# Patient Record
Sex: MALE | Race: WHITE | URBAN - METROPOLITAN AREA
[De-identification: names, ages, dates, MRNs, and addresses within clinical notes are randomized per-mention and may not be internally consistent; named-entity substitution may affect disease eponyms.]

---

## 2022-01-28 ENCOUNTER — OFFICE VISIT (OUTPATIENT)
Dept: URBAN - METROPOLITAN AREA CLINIC 23 | Facility: CLINIC | Age: 82
End: 2022-01-28
Payer: COMMERCIAL

## 2022-01-28 DIAGNOSIS — E11.9 TYPE 2 DIABETES MELLITUS W/O COMPLICATION: ICD-10-CM

## 2022-01-28 PROCEDURE — 99204 OFFICE O/P NEW MOD 45 MIN: CPT | Performed by: OPTOMETRIST

## 2022-01-28 ASSESSMENT — KERATOMETRY
OD: 45.38
OS: 44.88

## 2022-01-28 ASSESSMENT — INTRAOCULAR PRESSURE
OD: 16
OS: 14

## 2022-01-28 ASSESSMENT — VISUAL ACUITY
OD: 20/60
OS: 20/40

## 2022-01-28 NOTE — IMPRESSION/PLAN
Impression: Type 2 diabetes mellitus w/o complication: H40.4 Bilateral. Condition: stable. Plan: Diabetes type II: no background retinopathy, no signs of neovascularization noted. OPTOS photos ordered and reviewed. Discussed ocular and systemic benefits of blood sugar control.

## 2022-01-28 NOTE — IMPRESSION/PLAN
Impression: Combined forms of age-related cataract, bilateral: H25.813 Bilateral. Condition: moderate. Symptoms: could improve with surgery. Vision: vision affected. Plan: Cataracts account for the patient's complaints. Patient understands changing glasses will not improve vision. Patient desires to have surgery, recommend surgical consult. Patient desires to have OD only done at this time. Briefly discussed advanced technology options.

## 2022-02-09 ENCOUNTER — OFFICE VISIT (OUTPATIENT)
Dept: URBAN - METROPOLITAN AREA CLINIC 28 | Facility: CLINIC | Age: 82
End: 2022-02-09
Payer: COMMERCIAL

## 2022-02-09 DIAGNOSIS — H25.813 COMBINED FORMS OF AGE-RELATED CATARACT, BILATERAL: Primary | ICD-10-CM

## 2022-02-09 PROCEDURE — 99204 OFFICE O/P NEW MOD 45 MIN: CPT | Performed by: OPHTHALMOLOGY

## 2022-02-09 ASSESSMENT — INTRAOCULAR PRESSURE
OD: 15
OS: 17

## 2022-02-09 ASSESSMENT — VISUAL ACUITY
OD: 20/40
OS: 20/40

## 2022-02-09 ASSESSMENT — KERATOMETRY
OD: 45.50
OS: 45.25

## 2022-03-07 ENCOUNTER — PRE-OPERATIVE VISIT (OUTPATIENT)
Dept: URBAN - METROPOLITAN AREA CLINIC 28 | Facility: CLINIC | Age: 82
End: 2022-03-07
Payer: MEDICARE

## 2022-03-07 RX ORDER — DEXAMETHASONE 517 UG/.005ML
9 % INJECTION, SUSPENSION INTRAOCULAR
Qty: 0 | Refills: 0 | Status: ACTIVE
Start: 2022-03-07

## 2022-03-07 ASSESSMENT — PACHYMETRY
OS: 3.13
OS: 23.99
OD: 24.22
OD: 3.18

## 2022-03-15 ENCOUNTER — POST-OPERATIVE VISIT (OUTPATIENT)
Dept: URBAN - METROPOLITAN AREA CLINIC 28 | Facility: CLINIC | Age: 82
End: 2022-03-15
Payer: MEDICARE

## 2022-03-15 DIAGNOSIS — Z48.810 ENCOUNTER FOR SURGICAL AFTERCARE FOLLOWING SURGERY ON A SENSE ORGAN: Primary | ICD-10-CM

## 2022-03-15 PROCEDURE — 99024 POSTOP FOLLOW-UP VISIT: CPT | Performed by: OPTOMETRIST

## 2022-03-15 ASSESSMENT — INTRAOCULAR PRESSURE
OD: 18
OS: 16

## 2022-03-15 NOTE — IMPRESSION/PLAN
Impression: S/P CE/Standard IOL LI61SE 16.00 OD - 1 Day. Encounter for surgical aftercare following surgery on a sense organ  Z48.810. Post operative instructions reviewed - Plan: --Advised patient to use artificial tears for comfort.

## 2022-03-23 ENCOUNTER — OFFICE VISIT (OUTPATIENT)
Dept: URBAN - METROPOLITAN AREA CLINIC 28 | Facility: CLINIC | Age: 82
End: 2022-03-23
Payer: MEDICARE

## 2022-03-23 DIAGNOSIS — H25.812 COMBINED FORMS OF AGE-RELATED CATARACT, LEFT EYE: Primary | ICD-10-CM

## 2022-03-23 PROCEDURE — 99213 OFFICE O/P EST LOW 20 MIN: CPT | Performed by: OPHTHALMOLOGY

## 2022-03-23 ASSESSMENT — INTRAOCULAR PRESSURE
OS: 18
OD: 20

## 2022-03-23 ASSESSMENT — VISUAL ACUITY: OS: 20/40

## 2022-03-29 ENCOUNTER — POST-OPERATIVE VISIT (OUTPATIENT)
Dept: URBAN - METROPOLITAN AREA CLINIC 28 | Facility: CLINIC | Age: 82
End: 2022-03-29
Payer: MEDICARE

## 2022-03-29 PROCEDURE — 99024 POSTOP FOLLOW-UP VISIT: CPT | Performed by: OPTOMETRIST

## 2022-03-29 ASSESSMENT — INTRAOCULAR PRESSURE
OS: 19
OD: 19

## 2022-04-04 ENCOUNTER — POST-OPERATIVE VISIT (OUTPATIENT)
Dept: URBAN - METROPOLITAN AREA CLINIC 28 | Facility: CLINIC | Age: 82
End: 2022-04-04
Payer: MEDICARE

## 2022-04-04 DIAGNOSIS — Z96.1 PRESENCE OF INTRAOCULAR LENS: Primary | ICD-10-CM

## 2022-04-04 PROCEDURE — 99024 POSTOP FOLLOW-UP VISIT: CPT | Performed by: OPTOMETRIST

## 2022-04-04 ASSESSMENT — INTRAOCULAR PRESSURE
OS: 13
OD: 13

## 2022-04-04 NOTE — IMPRESSION/PLAN
Impression: S/P CE/ IOL STD; DEXYCU Lens: LI61SE +16.50 OS - 7 Days. Presence of intraocular lens  Z96.1. Condition is improving - Plan: --Advised patient to use artificial tears for comfort.

## 2022-05-02 ENCOUNTER — POST-OPERATIVE VISIT (OUTPATIENT)
Dept: URBAN - METROPOLITAN AREA CLINIC 28 | Facility: CLINIC | Age: 82
End: 2022-05-02
Payer: MEDICARE

## 2022-05-02 DIAGNOSIS — H52.223 REGULAR ASTIGMATISM, BILATERAL: ICD-10-CM

## 2022-05-02 DIAGNOSIS — Z96.1 PRESENCE OF INTRAOCULAR LENS: Primary | ICD-10-CM

## 2022-05-02 PROCEDURE — 99024 POSTOP FOLLOW-UP VISIT: CPT | Performed by: OPTOMETRIST

## 2022-05-02 ASSESSMENT — KERATOMETRY
OD: 45.38
OS: 46.00

## 2022-05-02 ASSESSMENT — VISUAL ACUITY
OD: 20/20
OS: 20/20

## 2022-05-02 ASSESSMENT — INTRAOCULAR PRESSURE
OS: 17
OD: 17

## 2022-05-02 NOTE — IMPRESSION/PLAN
Impression: S/P CE/ IOL STD; DEXYCU Lens: LI61SE +16.50 OS - 35 Days. Presence of intraocular lens  Z96.1. Opacified Capsule OU. Plan: Discussed diagnosis in detail with patient. Discussed treatment options with patient. New glasses Rx was given today. --Advised patient to use artificial tears for comfort.

## 2022-08-02 ENCOUNTER — OFFICE VISIT (OUTPATIENT)
Dept: URBAN - METROPOLITAN AREA CLINIC 28 | Facility: CLINIC | Age: 82
End: 2022-08-02
Payer: MEDICARE

## 2022-08-02 DIAGNOSIS — H26.493 OTHER SECONDARY CATARACT, BILATERAL: Primary | ICD-10-CM

## 2022-08-02 DIAGNOSIS — H18.593 OTHER HEREDITARY CORNEAL DYSTROPHIES, BILATERAL: ICD-10-CM

## 2022-08-02 PROCEDURE — 92014 COMPRE OPH EXAM EST PT 1/>: CPT | Performed by: OPTOMETRIST

## 2022-08-02 ASSESSMENT — INTRAOCULAR PRESSURE
OD: 16
OS: 16

## 2023-08-02 ENCOUNTER — OFFICE VISIT (OUTPATIENT)
Dept: URBAN - METROPOLITAN AREA CLINIC 28 | Facility: CLINIC | Age: 83
End: 2023-08-02
Payer: MEDICARE

## 2023-08-02 DIAGNOSIS — H26.493 OTHER SECONDARY CATARACT, BILATERAL: ICD-10-CM

## 2023-08-02 DIAGNOSIS — E11.9 TYPE 2 DIABETES MELLITUS W/O COMPLICATION: Primary | ICD-10-CM

## 2023-08-02 DIAGNOSIS — H18.513 ENDOTHELIAL CORNEAL DYSTROPHY, BILATERAL: ICD-10-CM

## 2023-08-02 DIAGNOSIS — H52.223 REGULAR ASTIGMATISM, BILATERAL: ICD-10-CM

## 2023-08-02 PROCEDURE — 92014 COMPRE OPH EXAM EST PT 1/>: CPT | Performed by: OPTOMETRIST

## 2023-08-02 ASSESSMENT — VISUAL ACUITY
OS: 20/20
OD: 20/20

## 2023-08-02 ASSESSMENT — KERATOMETRY
OD: 45.38
OS: 43.63

## 2023-08-02 ASSESSMENT — INTRAOCULAR PRESSURE
OD: 16
OS: 15

## 2024-08-05 ENCOUNTER — OFFICE VISIT (OUTPATIENT)
Dept: URBAN - METROPOLITAN AREA CLINIC 28 | Facility: CLINIC | Age: 84
End: 2024-08-05
Payer: MEDICARE

## 2024-08-05 DIAGNOSIS — E11.9 TYPE 2 DIABETES MELLITUS W/O COMPLICATION: Primary | ICD-10-CM

## 2024-08-05 DIAGNOSIS — H18.513 ENDOTHELIAL CORNEAL DYSTROPHY, BILATERAL: ICD-10-CM

## 2024-08-05 DIAGNOSIS — H43.391 OTHER VITREOUS OPACITIES, RIGHT EYE: ICD-10-CM

## 2024-08-05 DIAGNOSIS — Z79.84 LONG TERM (CURRENT) USE OF ORAL HYPOGLYCEMIC DRUGS: ICD-10-CM

## 2024-08-05 DIAGNOSIS — H52.223 REGULAR ASTIGMATISM, BILATERAL: ICD-10-CM

## 2024-08-05 DIAGNOSIS — H26.493 OTHER SECONDARY CATARACT, BILATERAL: ICD-10-CM

## 2024-08-05 PROCEDURE — 92014 COMPRE OPH EXAM EST PT 1/>: CPT | Performed by: OPTOMETRIST

## 2024-08-05 ASSESSMENT — INTRAOCULAR PRESSURE
OD: 17
OS: 17

## 2024-08-05 ASSESSMENT — VISUAL ACUITY
OD: 20/20
OS: 20/20

## 2025-08-18 ENCOUNTER — OFFICE VISIT (OUTPATIENT)
Dept: URBAN - METROPOLITAN AREA CLINIC 28 | Facility: CLINIC | Age: 85
End: 2025-08-18
Payer: MEDICARE

## 2025-08-18 DIAGNOSIS — H26.493 OTHER SECONDARY CATARACT, BILATERAL: ICD-10-CM

## 2025-08-18 DIAGNOSIS — H18.513 ENDOTHELIAL CORNEAL DYSTROPHY, BILATERAL: ICD-10-CM

## 2025-08-18 DIAGNOSIS — Z79.84 LONG TERM (CURRENT) USE OF ORAL HYPOGLYCEMIC DRUGS: ICD-10-CM

## 2025-08-18 DIAGNOSIS — E11.9 TYPE 2 DIABETES MELLITUS WITHOUT COMPLICATIONS: Primary | ICD-10-CM

## 2025-08-18 DIAGNOSIS — H35.363 DRUSEN (DEGENERATIVE) OF MACULA, BILATERAL: ICD-10-CM

## 2025-08-18 PROCEDURE — 92014 COMPRE OPH EXAM EST PT 1/>: CPT | Performed by: OPTOMETRIST

## 2025-08-18 PROCEDURE — 92134 CPTRZ OPH DX IMG PST SGM RTA: CPT | Performed by: OPTOMETRIST

## 2025-08-18 ASSESSMENT — INTRAOCULAR PRESSURE
OS: 12
OD: 16